# Patient Record
Sex: MALE | Race: WHITE | NOT HISPANIC OR LATINO
[De-identification: names, ages, dates, MRNs, and addresses within clinical notes are randomized per-mention and may not be internally consistent; named-entity substitution may affect disease eponyms.]

---

## 2019-02-28 ENCOUNTER — HOSPITAL (OUTPATIENT)
Dept: OTHER | Age: 79
End: 2019-02-28
Attending: INTERNAL MEDICINE

## 2019-03-01 ENCOUNTER — HOSPITAL (OUTPATIENT)
Dept: OTHER | Age: 79
End: 2019-03-01
Attending: INTERNAL MEDICINE

## 2019-04-01 ENCOUNTER — HOSPITAL (OUTPATIENT)
Dept: OTHER | Age: 79
End: 2019-04-01
Attending: INTERNAL MEDICINE

## 2019-05-01 ENCOUNTER — HOSPITAL (OUTPATIENT)
Dept: OTHER | Age: 79
End: 2019-05-01
Attending: INTERNAL MEDICINE

## 2019-06-17 ENCOUNTER — HOSPITAL ENCOUNTER (INPATIENT)
Facility: HOSPITAL | Age: 79
LOS: 3 days | Discharge: HOME HEALTH | DRG: 189 | End: 2019-06-20
Attending: EMERGENCY MEDICINE | Admitting: HOSPITALIST
Payer: MEDICARE

## 2019-06-17 ENCOUNTER — APPOINTMENT (OUTPATIENT)
Dept: NUCLEAR MEDICINE | Facility: HOSPITAL | Age: 79
DRG: 189 | End: 2019-06-17
Attending: HOSPITALIST
Payer: MEDICARE

## 2019-06-17 ENCOUNTER — APPOINTMENT (OUTPATIENT)
Dept: CT IMAGING | Facility: HOSPITAL | Age: 79
DRG: 189 | End: 2019-06-17
Attending: HOSPITALIST
Payer: MEDICARE

## 2019-06-17 ENCOUNTER — APPOINTMENT (OUTPATIENT)
Dept: GENERAL RADIOLOGY | Facility: HOSPITAL | Age: 79
DRG: 189 | End: 2019-06-17
Attending: EMERGENCY MEDICINE
Payer: MEDICARE

## 2019-06-17 DIAGNOSIS — J44.1 COPD EXACERBATION (HCC): ICD-10-CM

## 2019-06-17 DIAGNOSIS — J96.01 ACUTE HYPOXEMIC RESPIRATORY FAILURE (HCC): Primary | ICD-10-CM

## 2019-06-17 PROCEDURE — 71250 CT THORAX DX C-: CPT | Performed by: HOSPITALIST

## 2019-06-17 PROCEDURE — 99223 1ST HOSP IP/OBS HIGH 75: CPT | Performed by: HOSPITALIST

## 2019-06-17 PROCEDURE — 99223 1ST HOSP IP/OBS HIGH 75: CPT | Performed by: INTERNAL MEDICINE

## 2019-06-17 PROCEDURE — 78582 LUNG VENTILAT&PERFUS IMAGING: CPT | Performed by: HOSPITALIST

## 2019-06-17 PROCEDURE — 71045 X-RAY EXAM CHEST 1 VIEW: CPT | Performed by: EMERGENCY MEDICINE

## 2019-06-17 RX ORDER — POLYETHYLENE GLYCOL 3350 17 G/17G
17 POWDER, FOR SOLUTION ORAL DAILY PRN
Status: DISCONTINUED | OUTPATIENT
Start: 2019-06-17 | End: 2019-06-20

## 2019-06-17 RX ORDER — PRAVASTATIN SODIUM 20 MG
20 TABLET ORAL NIGHTLY
Status: DISCONTINUED | OUTPATIENT
Start: 2019-06-17 | End: 2019-06-20

## 2019-06-17 RX ORDER — METHYLPREDNISOLONE SODIUM SUCCINATE 125 MG/2ML
60 INJECTION, POWDER, LYOPHILIZED, FOR SOLUTION INTRAMUSCULAR; INTRAVENOUS EVERY 8 HOURS
Status: DISCONTINUED | OUTPATIENT
Start: 2019-06-17 | End: 2019-06-18

## 2019-06-17 RX ORDER — METOPROLOL TARTRATE 50 MG/1
50 TABLET, FILM COATED ORAL 2 TIMES DAILY
Status: DISCONTINUED | OUTPATIENT
Start: 2019-06-17 | End: 2019-06-20

## 2019-06-17 RX ORDER — MORPHINE SULFATE 2 MG/ML
1 INJECTION, SOLUTION INTRAMUSCULAR; INTRAVENOUS EVERY 2 HOUR PRN
Status: DISCONTINUED | OUTPATIENT
Start: 2019-06-17 | End: 2019-06-20

## 2019-06-17 RX ORDER — SODIUM CHLORIDE 0.9 % (FLUSH) 0.9 %
3 SYRINGE (ML) INJECTION AS NEEDED
Status: DISCONTINUED | OUTPATIENT
Start: 2019-06-17 | End: 2019-06-20

## 2019-06-17 RX ORDER — FLUTICASONE PROPIONATE 50 MCG
2 SPRAY, SUSPENSION (ML) NASAL DAILY
COMMUNITY

## 2019-06-17 RX ORDER — OMEPRAZOLE 40 MG/1
20 CAPSULE, DELAYED RELEASE ORAL
COMMUNITY

## 2019-06-17 RX ORDER — MORPHINE SULFATE 4 MG/ML
4 INJECTION, SOLUTION INTRAMUSCULAR; INTRAVENOUS EVERY 2 HOUR PRN
Status: DISCONTINUED | OUTPATIENT
Start: 2019-06-17 | End: 2019-06-20

## 2019-06-17 RX ORDER — CLOPIDOGREL BISULFATE 75 MG/1
75 TABLET ORAL DAILY
Status: DISCONTINUED | OUTPATIENT
Start: 2019-06-17 | End: 2019-06-20

## 2019-06-17 RX ORDER — HYDROCODONE BITARTRATE AND ACETAMINOPHEN 5; 325 MG/1; MG/1
2 TABLET ORAL EVERY 4 HOURS PRN
Status: DISCONTINUED | OUTPATIENT
Start: 2019-06-17 | End: 2019-06-20

## 2019-06-17 RX ORDER — DEXTROSE MONOHYDRATE 25 G/50ML
50 INJECTION, SOLUTION INTRAVENOUS AS NEEDED
Status: DISCONTINUED | OUTPATIENT
Start: 2019-06-17 | End: 2019-06-20

## 2019-06-17 RX ORDER — SIMVASTATIN 20 MG
20 TABLET ORAL NIGHTLY
COMMUNITY

## 2019-06-17 RX ORDER — LOSARTAN POTASSIUM 50 MG/1
50 TABLET ORAL DAILY
Status: DISCONTINUED | OUTPATIENT
Start: 2019-06-17 | End: 2019-06-18

## 2019-06-17 RX ORDER — ACETAMINOPHEN 325 MG/1
650 TABLET ORAL EVERY 6 HOURS PRN
Status: DISCONTINUED | OUTPATIENT
Start: 2019-06-17 | End: 2019-06-20

## 2019-06-17 RX ORDER — ONDANSETRON 2 MG/ML
4 INJECTION INTRAMUSCULAR; INTRAVENOUS EVERY 6 HOURS PRN
Status: DISCONTINUED | OUTPATIENT
Start: 2019-06-17 | End: 2019-06-20

## 2019-06-17 RX ORDER — FENOFIBRATE 134 MG/1
134 CAPSULE ORAL
Status: DISCONTINUED | OUTPATIENT
Start: 2019-06-17 | End: 2019-06-20

## 2019-06-17 RX ORDER — IPRATROPIUM BROMIDE AND ALBUTEROL SULFATE 2.5; .5 MG/3ML; MG/3ML
3 SOLUTION RESPIRATORY (INHALATION) EVERY 4 HOURS PRN
Status: DISCONTINUED | OUTPATIENT
Start: 2019-06-17 | End: 2019-06-20

## 2019-06-17 RX ORDER — CETIRIZINE HYDROCHLORIDE 5 MG/1
5 TABLET ORAL DAILY
Status: DISCONTINUED | OUTPATIENT
Start: 2019-06-17 | End: 2019-06-20

## 2019-06-17 RX ORDER — CLOPIDOGREL BISULFATE 75 MG/1
75 TABLET ORAL DAILY
COMMUNITY

## 2019-06-17 RX ORDER — PANTOPRAZOLE SODIUM 40 MG/1
40 TABLET, DELAYED RELEASE ORAL
Status: DISCONTINUED | OUTPATIENT
Start: 2019-06-17 | End: 2019-06-20

## 2019-06-17 RX ORDER — FLUTICASONE PROPIONATE 50 MCG
2 SPRAY, SUSPENSION (ML) NASAL DAILY
Status: DISCONTINUED | OUTPATIENT
Start: 2019-06-17 | End: 2019-06-20

## 2019-06-17 RX ORDER — METOPROLOL TARTRATE 50 MG/1
50 TABLET, FILM COATED ORAL 2 TIMES DAILY
Status: ON HOLD | COMMUNITY
End: 2019-06-20

## 2019-06-17 RX ORDER — IPRATROPIUM BROMIDE AND ALBUTEROL SULFATE 2.5; .5 MG/3ML; MG/3ML
3 SOLUTION RESPIRATORY (INHALATION) ONCE
Status: COMPLETED | OUTPATIENT
Start: 2019-06-17 | End: 2019-06-17

## 2019-06-17 RX ORDER — HEPARIN SODIUM 5000 [USP'U]/ML
5000 INJECTION, SOLUTION INTRAVENOUS; SUBCUTANEOUS EVERY 8 HOURS SCHEDULED
Status: DISCONTINUED | OUTPATIENT
Start: 2019-06-17 | End: 2019-06-20

## 2019-06-17 RX ORDER — HYDRALAZINE HYDROCHLORIDE 20 MG/ML
10 INJECTION INTRAMUSCULAR; INTRAVENOUS EVERY 4 HOURS PRN
Status: DISCONTINUED | OUTPATIENT
Start: 2019-06-17 | End: 2019-06-20

## 2019-06-17 RX ORDER — DOCUSATE SODIUM 100 MG/1
100 CAPSULE, LIQUID FILLED ORAL 2 TIMES DAILY
Status: DISCONTINUED | OUTPATIENT
Start: 2019-06-17 | End: 2019-06-20

## 2019-06-17 RX ORDER — MONTELUKAST SODIUM 10 MG/1
10 TABLET ORAL NIGHTLY
COMMUNITY

## 2019-06-17 RX ORDER — MONTELUKAST SODIUM 10 MG/1
10 TABLET ORAL NIGHTLY
Status: DISCONTINUED | OUTPATIENT
Start: 2019-06-17 | End: 2019-06-20

## 2019-06-17 RX ORDER — HYDROCODONE BITARTRATE AND ACETAMINOPHEN 5; 325 MG/1; MG/1
1 TABLET ORAL EVERY 4 HOURS PRN
Status: DISCONTINUED | OUTPATIENT
Start: 2019-06-17 | End: 2019-06-20

## 2019-06-17 RX ORDER — FEXOFENADINE HCL 180 MG/1
180 TABLET ORAL DAILY
COMMUNITY

## 2019-06-17 RX ORDER — SODIUM CHLORIDE 9 MG/ML
INJECTION, SOLUTION INTRAVENOUS ONCE
Status: COMPLETED | OUTPATIENT
Start: 2019-06-17 | End: 2019-06-17

## 2019-06-17 RX ORDER — METOCLOPRAMIDE HYDROCHLORIDE 5 MG/ML
5 INJECTION INTRAMUSCULAR; INTRAVENOUS EVERY 8 HOURS PRN
Status: DISCONTINUED | OUTPATIENT
Start: 2019-06-17 | End: 2019-06-20

## 2019-06-17 RX ORDER — BISACODYL 10 MG
10 SUPPOSITORY, RECTAL RECTAL
Status: DISCONTINUED | OUTPATIENT
Start: 2019-06-17 | End: 2019-06-20

## 2019-06-17 RX ORDER — MORPHINE SULFATE 2 MG/ML
2 INJECTION, SOLUTION INTRAMUSCULAR; INTRAVENOUS EVERY 2 HOUR PRN
Status: DISCONTINUED | OUTPATIENT
Start: 2019-06-17 | End: 2019-06-20

## 2019-06-17 RX ORDER — HEPARIN SODIUM 5000 [USP'U]/ML
5000 INJECTION, SOLUTION INTRAVENOUS; SUBCUTANEOUS EVERY 12 HOURS SCHEDULED
Status: DISCONTINUED | OUTPATIENT
Start: 2019-06-17 | End: 2019-06-17

## 2019-06-17 RX ORDER — RUFINAMIDE 40 MG/ML
1 SUSPENSION ORAL DAILY
Status: DISCONTINUED | OUTPATIENT
Start: 2019-06-18 | End: 2019-06-20

## 2019-06-17 RX ORDER — NITROGLYCERIN 0.4 MG/1
0.4 TABLET SUBLINGUAL EVERY 5 MIN PRN
Status: DISCONTINUED | OUTPATIENT
Start: 2019-06-17 | End: 2019-06-20

## 2019-06-17 RX ORDER — FENOFIBRATE 145 MG/1
145 TABLET, COATED ORAL DAILY
COMMUNITY

## 2019-06-17 RX ORDER — PANTOPRAZOLE SODIUM 20 MG/1
20 TABLET, DELAYED RELEASE ORAL
Status: DISCONTINUED | OUTPATIENT
Start: 2019-06-17 | End: 2019-06-17

## 2019-06-17 RX ORDER — ACETAMINOPHEN 325 MG/1
650 TABLET ORAL EVERY 4 HOURS PRN
Status: DISCONTINUED | OUTPATIENT
Start: 2019-06-17 | End: 2019-06-20

## 2019-06-17 RX ORDER — LOSARTAN POTASSIUM 50 MG/1
50 TABLET ORAL DAILY
Status: ON HOLD | COMMUNITY
End: 2019-06-20

## 2019-06-17 RX ORDER — BUDESONIDE AND FORMOTEROL FUMARATE DIHYDRATE 160; 4.5 UG/1; UG/1
AEROSOL RESPIRATORY (INHALATION) 2 TIMES DAILY
COMMUNITY
End: 2019-07-01 | Stop reason: ALTCHOICE

## 2019-06-17 RX ORDER — METHYLPREDNISOLONE SODIUM SUCCINATE 125 MG/2ML
125 INJECTION, POWDER, LYOPHILIZED, FOR SOLUTION INTRAMUSCULAR; INTRAVENOUS ONCE
Status: COMPLETED | OUTPATIENT
Start: 2019-06-17 | End: 2019-06-17

## 2019-06-17 RX ORDER — IPRATROPIUM BROMIDE AND ALBUTEROL SULFATE 2.5; .5 MG/3ML; MG/3ML
3 SOLUTION RESPIRATORY (INHALATION)
Status: DISCONTINUED | OUTPATIENT
Start: 2019-06-17 | End: 2019-06-20

## 2019-06-17 RX ORDER — ZOLPIDEM TARTRATE 5 MG/1
2.5 TABLET ORAL NIGHTLY PRN
Status: DISCONTINUED | OUTPATIENT
Start: 2019-06-17 | End: 2019-06-20

## 2019-06-17 RX ORDER — IPRATROPIUM BROMIDE AND ALBUTEROL SULFATE 2.5; .5 MG/3ML; MG/3ML
3 SOLUTION RESPIRATORY (INHALATION)
Status: DISCONTINUED | OUTPATIENT
Start: 2019-06-17 | End: 2019-06-17

## 2019-06-17 NOTE — DIETARY NOTE
ADULT NUTRITION INITIAL ASSESSMENT    Pt is at moderate nutrition risk. Defer Dx of malnutrition until physical assessment completed.      CRITERIA FOR MALNUTRITION DIAGNOSIS:  N/A .    RECOMMENDATIONS TO MD:  See Nutrition Intervention     NUTRITION Karolyn [facility] Glucerna and sugar-free shakes while on steroids. Rational/use of oral supplements discussed with family. Flavor preferences obtained. .    - Vitamin and mineral supplements: multivitamin/mineral initiated.      - Feeding assistance: meal set up    - Nutrit • Insulin Aspart Pen  1-5 Units Subcutaneous TID CC       LABS: reviewed  Recent Labs     06/17/19  0438   *   BUN 35*   CREATSERUM 1.95*   CA 9.3      K 4.4      CO2 32.0   OSMOCALC 306*       NUTRITION RELATED PHYSICAL FINDINGS: defe

## 2019-06-17 NOTE — ED PROVIDER NOTES
Patient Seen in: [facility] AND [facility] Emergency Department    History   Patient presents with:  Dyspnea LINDA SOB (respiratory)    Stated Complaint:     HPI    25-year-old male with history of lung cancer, here with complaints of difficulty breathing for the normal for rhythm, no acute ST changes      Cardiac Monitor:    Patient placed on the cardiac monitor and a rhythm strip obtained with the indication of difficulty breathing.   Monitor shows regular rhythm at a rate of 87 bpm.     My interpretation is   nor - 5.80 x10(6)uL    HGB 11.6 (L) 13.0 - 17.5 g/dL    HCT 37.9 (L) 39.0 - 53.0 %    MCV 96.9 80.0 - 100.0 fL    MCH 29.7 26.0 - 34.0 pg    MCHC 30.6 (L) 31.0 - 37.0 g/dL    RDW-SD 48.0 (H) 35.1 - 46.3 fL    RDW 13.5 11.0 - 15.0 %    .0 150.0 - 450.0 1 report has been electronically signed and verified by the Radiologist whose name is printed above. DD:  06/17/2019/DT:  06/17/2019      EMERGENCY DEPARTMENT COURSE AND TREATMENT:  Patient's condition was critical during Emergency Department evaluation. [facility])  (primary encounter diagnosis)  COPD exacerbation ([facility]    Disposition:  Admit  6/17/2019  6:00 am    Follow-up:  No follow-up provider specified.   We recommend that you schedule follow up care with a primary care provider within the next three month

## 2019-06-17 NOTE — ED INITIAL ASSESSMENT (HPI)
Pt presents to ED via [facility] for shortness of breath since yesterday. Pt states he usually wears 10-15L of oxygen at home. Pt has a hx of lung cancer. Per medics pt had expiratory wheezing they gave 2 rounds of albuterol en route.

## 2019-06-17 NOTE — PROGRESS NOTES
[facility] Pharmacy Note:  Renal Dose Adjustment for Metoclopramide (REGLAN)    Anali Asencio has been prescribed Metoclopramide (REGLAN) 10 mg every 8 hours as needed for n/v.    Estimated Creatinine Clearance: 22.3 mL/min (A) (based on SCr of 1.95 mg/dL (H)).

## 2019-06-17 NOTE — H&P
[facility]    PATIENT'S NAME: Ashley Simms   ATTENDING PHYSICIAN: Verónica Clinton MD   PATIENT ACCOUNT#:   [id number]    LOCATION:  [address] [place] [address] #:   [id number]       YOB: 1940  ADMISSION DATE:       06/17/2 without getting short of breath, and he has no difficulty eating, choking, or swallowing. No unusual chest pains. No previous history of blood clots. No difficulties with his pain in the abdomen, difficulty urination.   No difficulty with bowel movements hypoxic and mildly hypercarbic respiratory failure on chronic hypoxic respiratory failure, probably hypercarbic as well related to COPD exacerbation, bronchitis. We will check CT scan of the chest, rule out pulmonary embolism. We will see how he does.   Evander Harada

## 2019-06-17 NOTE — CONSULTS
[facility]    Report of Consultation    Stephen Oshea Patient Status:  Inpatient    1940 MRN [id number]   Location [facility] 2W/SW Attending Art Clinton Day # 0 PCP PHYSICIAN NONSTAFF     Date of Admiss day   ipratropium-albuterol (DUONEB) nebulizer solution 3 mL 3 mL Nebulization 6 times per day   methylPREDNISolone Sodium Succ (Solu-MEDROL) injection 60 mg 60 mg Intravenous Q8H   ondansetron HCl (ZOFRAN) injection 4 mg 4 mg Intravenous Q6H PRN   azithro Date    WBC 10.7 06/17/2019    HGB 11.6 (L) 06/17/2019    HCT 37.9 (L) 06/17/2019    .0 06/17/2019    CREATSERUM 1.95 (H) 06/17/2019    BUN 35 (H) 06/17/2019     06/17/2019    K 4.4 06/17/2019     06/17/2019    CO2 32.0 06/17/2019    GLU ago    4– DVT prophylaxis with heparin subcu    5–full code per patient's wish          Thank you for allowing me to participate in the care of your patient. Lillie Leal  6/17/2019

## 2019-06-17 NOTE — PLAN OF CARE
Pulmonary (Raslan): titrate O2 to 92% while awake, 89% while asleep   Hospitalist [facility]): admission, home medications, chest CT, VQ scan     Patient remains alert, oriented, and able to participate in plan of care throughout shift.    Oxygen titrated a

## 2019-06-17 NOTE — PROGRESS NOTES
[facility] Pharmacy Note:  Renal Dose Adjustment for Cetirizine (ZYRTEC)    Stephani Celestin has been prescribed Cetirizine (Zyrtec) 10 mg orally daily. Estimated Creatinine Clearance: 22.3 mL/min (A) (based on SCr of 1.95 mg/dL (H)).     His calculated creatinin

## 2019-06-17 NOTE — ED NOTES
PT c/o sob since yesterday, respirations labored, pt states no relief with home o2 15L nc. Pt awake, alert, diminished lung sounds. MD to pt bedside, called to request bipap from respiratory.

## 2019-06-17 NOTE — CM/SW NOTE
6/17: SW met with patient and children, Oleg and Villa tan. Family reports that patient lives in a bi-level home with son Dirk maddox. Daughter, Oleg resides within 5 minutes from family. There are 8 steps to the second floor.  Patient is independent with ADLs/

## 2019-06-18 PROCEDURE — 99233 SBSQ HOSP IP/OBS HIGH 50: CPT | Performed by: NURSE PRACTITIONER

## 2019-06-18 PROCEDURE — 99233 SBSQ HOSP IP/OBS HIGH 50: CPT | Performed by: INTERNAL MEDICINE

## 2019-06-18 RX ORDER — METHYLPREDNISOLONE SODIUM SUCCINATE 40 MG/ML
40 INJECTION, POWDER, LYOPHILIZED, FOR SOLUTION INTRAMUSCULAR; INTRAVENOUS EVERY 12 HOURS
Status: DISCONTINUED | OUTPATIENT
Start: 2019-06-18 | End: 2019-06-19

## 2019-06-18 RX ORDER — AZITHROMYCIN 250 MG/1
500 TABLET, FILM COATED ORAL
Status: DISCONTINUED | OUTPATIENT
Start: 2019-06-19 | End: 2019-06-19

## 2019-06-18 NOTE — PLAN OF CARE
Problem: Patient Centered Care  Goal: Patient preferences are identified and integrated in the patient's plan of care  Description  Interventions:  - What would you like us to know as we care for you?    - Provide timely, complete, and accurate informati

## 2019-06-18 NOTE — PROGRESS NOTES
[place] [facility]    Progress Note    Maxine Carter Patient Status:  Inpatient    1940 MRN [id number]   Location [facility] 2W/SW Attending Art Clinton Day # 1 PCP PHYSICIAN NONSTAFF        Subjective:     Co malnutrition  - dietician consult    Reflux. Continue medications. DVT prophylaxis: subcutaneous heparin   Code status is Full Code. Discussed with patient during this hospitalization.    Dispo: pending, tx to floor      >35 mins spent, more than 50% o imaging. Bronchial wall thickening suggestive of mild chronic airway inflammation.       Dictated by (CST): Radha Valiente MD on 6/17/2019 at 13:11     Approved by (CST): Radha Valiente MD on 6/17/2019 at 13:18          Xr Chest Ap Portable  (cpt=71045)    Res changes have occurred Electronically signed on 06/17/2019 at 13:26 by Darryl Garner MD    Ekg 12-lead    Result Date: 6/17/2019  ECG Report  Interpretation  -------------------------- Atrial Rhythm Low voltage in limb leads.  possible inferior injury, artif

## 2019-06-18 NOTE — SLP NOTE
ADULT SWALLOWING EVALUATION    ASSESSMENT    ASSESSMENT/OVERALL IMPRESSION:  Orders rec'd, chart reviewed. SLP collaborated with RN prior to seeing patient.  RN reported no notable dysphagia during this AM. However, per patient report, no breakfast meal int failure [facility])  Active Problems:    COPD exacerbation [facility])      Past Medical History  Past Medical History:   Diagnosis Date   • Cancer ([facility]     lung        Prior Living Situation: (From home)  Diet Prior to Admission: Regular; Thin liquids  Precautions: A Intact  Bolus Formation: Intact  Bolus Propulsion: Intact  Mastication: Impaired  Retention: Intact    Pharyngeal Phase of Swallow: Within Functional Limits  (Please note: Silent aspiration cannot be evaluated clinically.  Videofluoroscopic Swallow Study is

## 2019-06-18 NOTE — PROGRESS NOTES
[place] [facility] - [facility]    Progress Note    Risabj Hathaway Patient Status:  Inpatient    1940 MRN [id number]   Location [facility] 2W/SW Attending Art Clinton Day # 1 PCP PHYSICIAN NONSTAFF        Subjective:     Co Wean down steroid   z-pack                    Results:     Lab Results   Component Value Date    WBC 10.0 06/18/2019    HGB 9.4 (L) 06/18/2019    HCT 30.2 (L) 06/18/2019    .0 06/18/2019    CREATSERUM 1.90 (H) 06/18/2019    BUN 43 (H) 06/18/2019 left more than right with blunting of both costophrenic angles. 2. Questionable ovoid density in the left perihilar region measuring 2.3 x 1.6 cm. Underlying mass is not excluded. Recommend comparison with old films or CT scanning.  3. Asymmetric right ap Shera Simmonds MD Ambrose Kirschner Eluterio Arm, MD  6/18/2019

## 2019-06-18 NOTE — SLP NOTE
SPEECH DAILY NOTE - INPATIENT    ASSESSMENT & PLAN   ASSESSMENT  Pt seen sitting upright in chair for all PO trials and monitoring. Pt with functional oral transit time and mastication with upper and lower dentures in place for session.  Rec upgrade to gene liquids without overt signs or symptoms of aspiration with 100 % accuracy over 1 session(s). Pt seen sitting upright in chair with upper and lower dentures in place for trial upgrade to hard solid mastication.  Pt with functional oral transit time and no

## 2019-06-18 NOTE — CM/SW NOTE
Care Coordination/BPCI-A:    Met with patient at bedside to explain the BPCI/Medicare program. Patient agreed with phone f/u for 3 months from [address] [place] [address] after discharge from [facility]. Patient was enrolled under DRG  190.  BPCI/Medicare Letter and Broch

## 2019-06-18 NOTE — OCCUPATIONAL THERAPY NOTE
OCCUPATIONAL THERAPY EVALUATION - INPATIENT     Room Number: 064/932-Z  Evaluation Date: 6/18/2019  Type of Evaluation: Initial       Physician Order: IP Consult to Occupational Therapy  Reason for Therapy: ADL/IADL Dysfunction and Discharge Planning    OC therapist. Patient left up in chair with all needs in reach. Continue skilled therapy in prep for d/c to home. RN notified that patient did experience drop in BP with activities- supine 109/57 to 90/54.       DISCHARGE RECOMMENDATIONS: Home with [facility] is within functional limits     STRENGTH ASSESSMENT  Upper extremity strength is within functional limits     ACTIVITIES OF DAILY LIVING ASSESSMENT  AM-PAC ‘6-Clicks’ Inpatient Daily Activity Short Form  How much help from another person does the patient c

## 2019-06-18 NOTE — PHYSICAL THERAPY NOTE
PHYSICAL THERAPY EVALUATION - INPATIENT     Room Number: 724/567-G  Evaluation Date: 6/18/2019  Type of Evaluation: Initial   Physician Order: PT Eval and Treat    Presenting Problem: Acute hypoxemic  Reason for Therapy: Mobility Dysfunction and Discharge fluidity of gait. Patient will benefit from continued IP PT services to address these deficits in preparation for discharge. DISCHARGE RECOMMENDATIONS  PT Discharge Recommendations: Home with home health PT; Intermittent Supervision    PLAN  PT Treatm 81  Heart Rate Source: Monitor     BP: 90/54  BP Location: Right arm  BP Method: Automatic  Patient Position: Sitting    O2 WALK        SPO2 Ambulation on Oxygen: 85(85% with activity, improved to 92% at rest)  Ambulation oxygen flow (liters per minute): 1 - rolling     Goal #2  Current Status    Goal #3 Patient is able to ambulate 150 feet with assist device: walker - rolling at assistance level: supervision while maintaining SpO2>90% with activity   Goal #3   Current Status    Goal #4 Patient will negotiat

## 2019-06-18 NOTE — HOME CARE LIAISON
Received referral from [facility], . Met with patient at the bedside. Then contacted his daughter Curtis James ([phone]), per his request. [facility] is agree[facility] to [facility], pending orders.  Residential brochure pro

## 2019-06-19 PROCEDURE — 99233 SBSQ HOSP IP/OBS HIGH 50: CPT | Performed by: HOSPITALIST

## 2019-06-19 PROCEDURE — 99232 SBSQ HOSP IP/OBS MODERATE 35: CPT | Performed by: INTERNAL MEDICINE

## 2019-06-19 RX ORDER — PREDNISONE 20 MG/1
40 TABLET ORAL
Status: DISCONTINUED | OUTPATIENT
Start: 2019-06-19 | End: 2019-06-20

## 2019-06-19 RX ORDER — AZITHROMYCIN 250 MG/1
500 TABLET, FILM COATED ORAL
Status: DISCONTINUED | OUTPATIENT
Start: 2019-06-19 | End: 2019-06-20

## 2019-06-19 RX ORDER — ECHINACEA PURPUREA EXTRACT 125 MG
1 TABLET ORAL
Status: DISCONTINUED | OUTPATIENT
Start: 2019-06-19 | End: 2019-06-20

## 2019-06-19 NOTE — PLAN OF CARE
Problem: Patient Centered Care  Goal: Patient preferences are identified and integrated in the patient's plan of care  Description  Interventions:  - What would you like us to know as we care for you?  I use oxygen at home  - Provide timely, complete, and respiratory difficulty  - Respiratory Therapy support as indicated  - Manage/alleviate anxiety  - Monitor for signs/symptoms of CO2 retention  6/19/2019 0549 by Duane Mackintosh, RN  Outcome: Progressing  6/18/2019 2325 by Duane Mackintosh, RN  Outcome: Pr

## 2019-06-19 NOTE — PROGRESS NOTES
Double RN skin check done prior to transfer off Unit. Skin check performed by this RN and Camilo Rodrigez RN. Wounds are as follows: blanchable redness on right butt cheek. Will remain available for any further questions or concerns.

## 2019-06-19 NOTE — PROGRESS NOTES
[facility]    Progress Note    Stephen Oshea Patient Status:  Inpatient    1940 MRN [id number]   Location [facility] 5SW/SE Attending Porsha Olvera, [address] Day # 2 PCP PHYSICIAN NONSTAFF        Subjective:     Luis CREATSERUM 1.88 (H) 06/19/2019    BUN 60 (H) 06/19/2019     06/19/2019    K 4.9 06/19/2019     06/19/2019    CO2 29.0 06/19/2019     (H) 06/19/2019    CA 9.0 06/19/2019    ALB 2.9 (L) 06/18/2019    ALKPHO 67 06/18/2019    BILT 0.4 06/18/ 6/17/2019  ECG Report  Interpretation  -------------------------- Sinus Rhythm Low voltage in limb leads.  -Incomplete left bundle branch block. -Inferior infarct -probably recent.  -Anterolateral ST-elevation -nondiagnostic -consider injury.  ABNORMAL Whe

## 2019-06-19 NOTE — PROGRESS NOTES
[place] [facility] - [facility]    Progress Note    Loetta Pallas Patient Status:  Inpatient    1940 MRN [id number]   Location [facility] 2W/SW Attending Art Clinton Day # 2 PCP PHYSICIAN NONSTAFF        Subjective:     Co disease. Continue medications. Protein calorie malnutrition  - dietician consult    Reflux. Continue medications. DVT prophylaxis: subcutaneous heparin   Code status is Full Code. Discussed with patient during this hospitalization.    Dispo: anastasia 6/17/2019 at 13:11     Approved by (CST): Flor Leung MD on 6/17/2019 at 13:18          Nm Lung Vq Vent / Perfusion Scan  (NZJ=[id number])    Result Date: 6/17/2019  CONCLUSION: Ventilation/perfusion lung scan findings are low probability for pulmonary embolis

## 2019-06-19 NOTE — PLAN OF CARE
Problem: Patient Centered Care  Goal: Patient preferences are identified and integrated in the patient's plan of care  Description  Interventions:  - What would you like us to know as we care for you?  I use oxygen at home  - Provide timely, complete, and ordered  - Assess for signs and symptoms of hyperglycemia and hypoglycemia  - Administer ordered medications to maintain glucose within target range  - Assess barriers to adequate nutritional intake and initiate nutrition consult as needed  - Instruct tamar

## 2019-06-19 NOTE — PROGRESS NOTES
[facility] Pharmacy Note:  Adjustment for azithromycin (Virgle Everts)    Dorothy Poag is a 78year old male who has been prescribed azithromycin (ZITHROMAX) 250 mg every 24 hrs.   CrCl is estimated creatinine clearance is 23.9 mL/min (A) (based on SCr of 1.88 mg/dL

## 2019-06-19 NOTE — OCCUPATIONAL THERAPY NOTE
OCCUPATIONAL THERAPY TREATMENT NOTE - INPATIENT        Room Number: 530/530-A                Problem List  Principal Problem:    Acute hypoxemic respiratory failure (HCC)  Active Problems:    [facility]      OCCUPATIONAL THERAPY ASSESSMENT anticipate pt would benefit from [facility] services at discharge.     DISCHARGE RECOMMENDATIONS  OT Discharge Recommendations: Home with home health PT/OT  OT Device Recommendations: Shower chair;Grab bars     PLAN  OT Treatment Plan: Energy conservation/work si within reach    OT Goals:        Patient will complete functional transfer with Mod I with LRAD   Comment: progressing    Patient will complete toileting with Mod I   Comment: pt declined    Patient will tolerate standing for 5-8 minutes in prep for adls w

## 2019-06-19 NOTE — PHYSICAL THERAPY NOTE
PHYSICAL THERAPY TREATMENT NOTE - INPATIENT     Room Number: 128/043-D       Presenting Problem: Acute hypoxemic    Problem List  Principal Problem:    Acute hypoxemic respiratory failure (HCC)  Active Problems:    COPD exacerbation (HCC)      PHYSICAL THE PT;Intermittent Supervision     PLAN  PT Treatment Plan: Bed mobility; Body mechanics; Endurance; Energy conservation;Patient education;Gait training;Transfer training;Balance training    SUBJECTIVE  \"I did this in pulmonary rehab. \"    OBJECTIVE  Precaution balance)  Stoop/Curb Assistance: Not tested  Comment : x 3 bouts of ambulation, saturation monitored closely    Patient End of Session: Up in chair;Needs met;Call light within reach;RN aware of session/findings; All patient questions and concerns addressed;

## 2019-06-20 VITALS
DIASTOLIC BLOOD PRESSURE: 51 MMHG | BODY MASS INDEX: 19.72 KG/M2 | OXYGEN SATURATION: 97 % | WEIGHT: 115.5 LBS | HEART RATE: 71 BPM | RESPIRATION RATE: 18 BRPM | SYSTOLIC BLOOD PRESSURE: 96 MMHG | HEIGHT: 64 IN | TEMPERATURE: 97 F

## 2019-06-20 PROCEDURE — 99239 HOSP IP/OBS DSCHRG MGMT >30: CPT | Performed by: HOSPITALIST

## 2019-06-20 PROCEDURE — 99232 SBSQ HOSP IP/OBS MODERATE 35: CPT | Performed by: INTERNAL MEDICINE

## 2019-06-20 RX ORDER — PREDNISONE 20 MG/1
TABLET ORAL
Qty: 15 TABLET | Refills: 0 | Status: SHIPPED | OUTPATIENT
Start: 2019-06-21 | End: 2019-07-03

## 2019-06-20 RX ORDER — METOPROLOL TARTRATE 50 MG/1
25 TABLET, FILM COATED ORAL 2 TIMES DAILY
Qty: 30 TABLET | Refills: 0 | Status: SHIPPED | OUTPATIENT
Start: 2019-06-20

## 2019-06-20 RX ORDER — AZITHROMYCIN 250 MG/1
250 TABLET, FILM COATED ORAL DAILY
Qty: 1 TABLET | Refills: 0 | Status: SHIPPED | OUTPATIENT
Start: 2019-06-20 | End: 2019-06-21

## 2019-06-20 NOTE — DISCHARGE SUMMARY
[facility]    Discharge Summary    Berlin Ocampo Patient Status:  Inpatient    1940 MRN [id number]   Location [facility] 5SW/SE Attending Tacos Parker, [address] [facility] Day # 3 PCP PHYSICIAN NONSTAFF     Date of Admission:  oxygen per minute but that is unusual for him depending on how he feels.     Hospital Course:       Acute hypoxic and mildly hypercarbic respiratory failure on chronic hypoxic respiratory failure, probably hypercarbic as well related to COPD exacerbation, b 6/21/2019      Take 2 tablets (40 mg total) by mouth daily with breakfast for 3 days, THEN 1.5 tablets (30 mg total) daily with breakfast for 3 days, THEN 1 tablet (20 mg total) daily with breakfast for 3 days, THEN 0.5 tablets (10 mg total) daily with naomi doctor or nurse    Bring a paper prescription for each of these medications  · azithromycin 250 MG Tabs  · Metoprolol Tartrate 50 MG Tabs  · predniSONE 20 MG Tabs         Follow up Visits:  Follow-up with PCP  in 1 week    Follow up Labs: none     Other Dis

## 2019-06-20 NOTE — OCCUPATIONAL THERAPY NOTE
OCCUPATIONAL THERAPY TREATMENT NOTE - INPATIENT        Room Number: 530/530-A                Problem List  Principal Problem:    Acute hypoxemic respiratory failure (HCC)  Active Problems:    [facility]      OCCUPATIONAL THERAPY ASSESSMENT another person does the patient currently need…  -   Putting on and taking off regular lower body clothing?: A Little  -   Bathing (including washing, rinsing, drying)?: A Little  -   Toileting, which includes using toilet, bedpan or urinal? : A Little  -

## 2019-06-20 NOTE — CM/SW NOTE
[facility] notified of patients discharge    Orders in [address] [facility], [address] [facility]  Phone # [phone]

## 2019-06-20 NOTE — RESPIRATORY THERAPY NOTE
Patient seen for COPD education. Education packet reviewed. Video shown. Encouraged the use of incentive spirometer and accapella. Encourage to make and keep appointment at COPD clinic.

## 2019-06-20 NOTE — PROGRESS NOTES
[facility] - [facility]    Progress Note      Assessment and Plan:   1. Acute COPD exacerbation–patient is much better clinically. Anxious to go home.     Recommendations: Okay to discharge home with azithromycin and prednisone and patient can follow-u

## 2019-06-20 NOTE — PLAN OF CARE
Problem: RESPIRATORY - ADULT  Goal: Achieves optimal ventilation and oxygenation  Description  INTERVENTIONS:  - Assess for changes in respiratory status  - Assess for changes in mentation and behavior  - Position to facilitate oxygenation and minimize r stability  Description  INTERVENTIONS:  - Monitor vital signs, rhythm, and trends  - Monitor for bleeding, hypotension and signs of decreased cardiac output  - Evaluate effectiveness of vasoactive medications to optimize hemodynamic stability  - Monitor ar

## 2019-06-21 ENCOUNTER — TELEPHONE (OUTPATIENT)
Dept: MEDSURG UNIT | Facility: HOSPITAL | Age: 79
End: 2019-06-21

## 2019-06-24 ENCOUNTER — TELEPHONE (OUTPATIENT)
Dept: MEDSURG UNIT | Facility: HOSPITAL | Age: 79
End: 2019-06-24

## 2019-07-01 ENCOUNTER — OFFICE VISIT (OUTPATIENT)
Dept: CARDIOLOGY CLINIC | Facility: HOSPITAL | Age: 79
End: 2019-07-01
Attending: CLINICAL NURSE SPECIALIST
Payer: MEDICARE

## 2019-07-01 VITALS
BODY MASS INDEX: 20 KG/M2 | DIASTOLIC BLOOD PRESSURE: 71 MMHG | OXYGEN SATURATION: 96 % | HEART RATE: 67 BPM | WEIGHT: 116 LBS | SYSTOLIC BLOOD PRESSURE: 154 MMHG

## 2019-07-01 DIAGNOSIS — J44.1 COPD EXACERBATION (HCC): Primary | ICD-10-CM

## 2019-07-01 DIAGNOSIS — J44.9 BRONCHITIS, CHRONIC OBSTRUCTIVE (HCC): ICD-10-CM

## 2019-07-01 PROBLEM — J44.89 BRONCHITIS, CHRONIC OBSTRUCTIVE: Status: ACTIVE | Noted: 2019-07-01

## 2019-07-01 PROBLEM — I10 HTN (HYPERTENSION): Status: ACTIVE | Noted: 2019-07-01

## 2019-07-01 PROCEDURE — 99214 OFFICE O/P EST MOD 30 MIN: CPT | Performed by: CLINICAL NURSE SPECIALIST

## 2019-07-01 PROCEDURE — 99212 OFFICE O/P EST SF 10 MIN: CPT | Performed by: CLINICAL NURSE SPECIALIST

## 2019-07-01 NOTE — PROGRESS NOTES
[id number] [facility] Patient Status:  Outpatient    1940 MRN [id number]   Location [facility] PHYSICIAN ИРИНА Michaels  is a 78year old male who presents to clinic for assessment of c 0.94 (H) 06/17/2019 07:19 AM    MG 2.3 06/18/2019 04:24 AM    TROP <0.045 06/17/2019 04:38 AM    PGLU 202 (H) 06/20/2019 12:23 PM       Clinical labs drawn by MA: NA    /72   Pulse 74   Wt 116 lb (52.6 kg)   SpO2 90%   BMI 19.91 kg/m²     D'c weight: monitor your blood pressure daily. If your blood pressure readings are >130, we may resume your Losartan      Please contact your pulmonologist if you experience increased shortness of breath or wheezing after stopping steroids.      Return to [facility]

## 2019-07-01 NOTE — PATIENT INSTRUCTIONS
Please start Trelegy inhaler once daily. Check with your insurance company and/or pharmacy to inquire about the cost of Trelegy moving forward. Please monitor your blood pressure daily.  If your blood pressure readings are >130, we may resume your L

## 2019-08-11 ENCOUNTER — HOSPITAL ENCOUNTER (INPATIENT)
Facility: HOSPITAL | Age: 79
LOS: 2 days | Discharge: HOME HEALTH | DRG: 189 | End: 2019-08-13
Attending: EMERGENCY MEDICINE | Admitting: HOSPITALIST
Payer: MEDICARE

## 2019-08-11 ENCOUNTER — APPOINTMENT (OUTPATIENT)
Dept: GENERAL RADIOLOGY | Facility: HOSPITAL | Age: 79
DRG: 189 | End: 2019-08-11
Payer: MEDICARE

## 2019-08-11 DIAGNOSIS — J44.1 COPD EXACERBATION (HCC): Primary | ICD-10-CM

## 2019-08-11 LAB
ANION GAP SERPL CALC-SCNC: 6 MMOL/L (ref 0–18)
BASOPHILS # BLD AUTO: 0.02 X10(3) UL (ref 0–0.2)
BASOPHILS NFR BLD AUTO: 0.3 %
BUN BLD-MCNC: 41 MG/DL (ref 7–18)
BUN/CREAT SERPL: 23.7 (ref 10–20)
CALCIUM BLD-MCNC: 9.5 MG/DL (ref 8.5–10.1)
CHLORIDE SERPL-SCNC: 109 MMOL/L (ref 98–112)
CO2 SERPL-SCNC: 30 MMOL/L (ref 21–32)
CREAT BLD-MCNC: 1.73 MG/DL (ref 0.7–1.3)
DEPRECATED RDW RBC AUTO: 51.2 FL (ref 35.1–46.3)
EOSINOPHIL # BLD AUTO: 0.3 X10(3) UL (ref 0–0.7)
EOSINOPHIL NFR BLD AUTO: 4.1 %
ERYTHROCYTE [DISTWIDTH] IN BLOOD BY AUTOMATED COUNT: 14.2 % (ref 11–15)
GLUCOSE BLD-MCNC: 117 MG/DL (ref 70–99)
HCT VFR BLD AUTO: 36.3 % (ref 39–53)
HGB BLD-MCNC: 11 G/DL (ref 13–17.5)
IMM GRANULOCYTES # BLD AUTO: 0.02 X10(3) UL (ref 0–1)
IMM GRANULOCYTES NFR BLD: 0.3 %
LYMPHOCYTES # BLD AUTO: 1.06 X10(3) UL (ref 1–4)
LYMPHOCYTES NFR BLD AUTO: 14.4 %
MCH RBC QN AUTO: 29.9 PG (ref 26–34)
MCHC RBC AUTO-ENTMCNC: 30.3 G/DL (ref 31–37)
MCV RBC AUTO: 98.6 FL (ref 80–100)
MONOCYTES # BLD AUTO: 0.51 X10(3) UL (ref 0.1–1)
MONOCYTES NFR BLD AUTO: 6.9 %
NEUTROPHILS # BLD AUTO: 5.47 X10 (3) UL (ref 1.5–7.7)
NEUTROPHILS # BLD AUTO: 5.47 X10(3) UL (ref 1.5–7.7)
NEUTROPHILS NFR BLD AUTO: 74 %
OSMOLALITY SERPL CALC.SUM OF ELEC: 311 MOSM/KG (ref 275–295)
PLATELET # BLD AUTO: 183 10(3)UL (ref 150–450)
POTASSIUM SERPL-SCNC: 4.2 MMOL/L (ref 3.5–5.1)
RBC # BLD AUTO: 3.68 X10(6)UL (ref 3.8–5.8)
SODIUM SERPL-SCNC: 145 MMOL/L (ref 136–145)
TROPONIN I SERPL-MCNC: <0.045 NG/ML (ref ?–0.04)
WBC # BLD AUTO: 7.4 X10(3) UL (ref 4–11)

## 2019-08-11 PROCEDURE — 99223 1ST HOSP IP/OBS HIGH 75: CPT | Performed by: INTERNAL MEDICINE

## 2019-08-11 PROCEDURE — 99223 1ST HOSP IP/OBS HIGH 75: CPT | Performed by: HOSPITALIST

## 2019-08-11 PROCEDURE — 71045 X-RAY EXAM CHEST 1 VIEW: CPT | Performed by: EMERGENCY MEDICINE

## 2019-08-11 RX ORDER — HEPARIN SODIUM 5000 [USP'U]/ML
5000 INJECTION, SOLUTION INTRAVENOUS; SUBCUTANEOUS EVERY 12 HOURS SCHEDULED
Status: DISCONTINUED | OUTPATIENT
Start: 2019-08-11 | End: 2019-08-13

## 2019-08-11 RX ORDER — AZITHROMYCIN 250 MG/1
250 TABLET, FILM COATED ORAL EVERY 24 HOURS
Status: DISCONTINUED | OUTPATIENT
Start: 2019-08-12 | End: 2019-08-12

## 2019-08-11 RX ORDER — ONDANSETRON 2 MG/ML
4 INJECTION INTRAMUSCULAR; INTRAVENOUS EVERY 6 HOURS PRN
Status: DISCONTINUED | OUTPATIENT
Start: 2019-08-11 | End: 2019-08-13

## 2019-08-11 RX ORDER — CLOPIDOGREL BISULFATE 75 MG/1
75 TABLET ORAL DAILY
Status: DISCONTINUED | OUTPATIENT
Start: 2019-08-12 | End: 2019-08-13

## 2019-08-11 RX ORDER — MONTELUKAST SODIUM 10 MG/1
10 TABLET ORAL NIGHTLY
Status: DISCONTINUED | OUTPATIENT
Start: 2019-08-12 | End: 2019-08-13

## 2019-08-11 RX ORDER — SODIUM CHLORIDE 0.9 % (FLUSH) 0.9 %
3 SYRINGE (ML) INJECTION AS NEEDED
Status: DISCONTINUED | OUTPATIENT
Start: 2019-08-11 | End: 2019-08-13

## 2019-08-11 RX ORDER — POLYETHYLENE GLYCOL 3350 17 G/17G
17 POWDER, FOR SOLUTION ORAL DAILY PRN
Status: DISCONTINUED | OUTPATIENT
Start: 2019-08-11 | End: 2019-08-13

## 2019-08-11 RX ORDER — ACETAMINOPHEN 325 MG/1
650 TABLET ORAL EVERY 6 HOURS PRN
Status: DISCONTINUED | OUTPATIENT
Start: 2019-08-11 | End: 2019-08-13

## 2019-08-11 RX ORDER — PANTOPRAZOLE SODIUM 40 MG/1
40 TABLET, DELAYED RELEASE ORAL
Status: DISCONTINUED | OUTPATIENT
Start: 2019-08-12 | End: 2019-08-13

## 2019-08-11 RX ORDER — IPRATROPIUM BROMIDE AND ALBUTEROL SULFATE 2.5; .5 MG/3ML; MG/3ML
3 SOLUTION RESPIRATORY (INHALATION)
Status: DISCONTINUED | OUTPATIENT
Start: 2019-08-11 | End: 2019-08-13

## 2019-08-11 RX ORDER — CETIRIZINE HYDROCHLORIDE 5 MG/1
5 TABLET ORAL DAILY
Status: DISCONTINUED | OUTPATIENT
Start: 2019-08-12 | End: 2019-08-13

## 2019-08-11 RX ORDER — IPRATROPIUM BROMIDE AND ALBUTEROL SULFATE 2.5; .5 MG/3ML; MG/3ML
3 SOLUTION RESPIRATORY (INHALATION) ONCE
Status: COMPLETED | OUTPATIENT
Start: 2019-08-11 | End: 2019-08-11

## 2019-08-11 RX ORDER — METHYLPREDNISOLONE SODIUM SUCCINATE 40 MG/ML
40 INJECTION, POWDER, LYOPHILIZED, FOR SOLUTION INTRAMUSCULAR; INTRAVENOUS EVERY 8 HOURS
Status: DISCONTINUED | OUTPATIENT
Start: 2019-08-11 | End: 2019-08-12

## 2019-08-11 RX ORDER — FLUTICASONE PROPIONATE 50 MCG
2 SPRAY, SUSPENSION (ML) NASAL NIGHTLY
Status: DISCONTINUED | OUTPATIENT
Start: 2019-08-11 | End: 2019-08-13

## 2019-08-11 RX ORDER — ATORVASTATIN CALCIUM 10 MG/1
10 TABLET, FILM COATED ORAL NIGHTLY
Status: DISCONTINUED | OUTPATIENT
Start: 2019-08-11 | End: 2019-08-13

## 2019-08-11 RX ORDER — ALBUTEROL SULFATE 2.5 MG/3ML
2.5 SOLUTION RESPIRATORY (INHALATION) ONCE
Status: COMPLETED | OUTPATIENT
Start: 2019-08-11 | End: 2019-08-11

## 2019-08-11 RX ORDER — PREDNISONE 20 MG/1
40 TABLET ORAL ONCE
Status: COMPLETED | OUTPATIENT
Start: 2019-08-11 | End: 2019-08-11

## 2019-08-11 NOTE — ED NOTES
Orders for admission, patient is aware of plan and ready to go upstairs. Any questions, please call ED RN Denae Luther  at extension [phone].

## 2019-08-11 NOTE — CONSULTS
[facility] - [facility]    Consult Note    Date:  8/11/2019  Date of Admission:  8/11/2019      Chief Complaint:   Conrad Alex is a(n) 78year old male with dyspnea.     HPI:   The patient has a history of end-stage COPD as he is followed by Dr. Hutson Room auscultation. Cardiac regular rate and rhythm no murmur. Abdomen nontender, without hepatosplenomegaly and no mass appreciable. Extremities without clubbing cyanosis nor edema. Neurologic grossly intact with symmetric tone and strength and reflex.

## 2019-08-11 NOTE — ED INITIAL ASSESSMENT (HPI)
Pt arrived via medics to rm 21 with nrb 15l for complaint of sob, 02 sats at home 93% on 10l 02, pt denies chest pain

## 2019-08-11 NOTE — ED PROVIDER NOTES
Patient Seen in: [facility] AND [facility] Emergency Department    History   Patient presents with:  Dyspnea LINDA SOB (respiratory)    Stated Complaint: sob    HPI    Patient is a 25-year-old male who presents to the emergency department with a chief complaint of Pulmonary/Chest: Accessory muscle usage present. Tachypnea noted. He is in respiratory distress. He has decreased breath sounds. He has wheezes. Musculoskeletal: Normal range of motion. Neurological: He is alert and oriented to person, place, and time. Please view results for these tests on the individual orders. CBC WITH DIFFERENTIAL WITH PLATELET    Narrative: The following orders were created for panel order CBC WITH DIFFERENTIAL WITH PLATELET.   Procedure                               Abnormalit

## 2019-08-11 NOTE — ED NOTES
Walked pt in hernandez with portable 02 10L nc, 02 sats dropped to 80%, pt unable to speak in full sentences

## 2019-08-11 NOTE — PROGRESS NOTES
[facility] Pharmacy Note:  Renal Dose Adjustment for Cetirizine (ZYRTEC)    Janiya Condon has been prescribed Cetirizine (Zyrtec) 10 mg orally daily. Estimated Creatinine Clearance: 25.7 mL/min (A) (based on SCr of 1.73 mg/dL (H)).     His calculated creatinin

## 2019-08-11 NOTE — ED NOTES
Tray delivered to bedside. Pt eating lunch. Denies needs or complaints at this time. Pt declines SOB at this time.

## 2019-08-11 NOTE — H&P
24    Patient: Jan Babb  : 1954 Visit date: 2024    Dear  Obdulia Waldrop MD    Thank you for referring Jan Babb to my practice.  Please find my assessment and plan below.        Good evening.  Thank you for the referral.  I saw Brandon in my clinic last week.  She has bilateral inguinal hernias and has symptoms including severe pain especially on the left side with activity.  She would be a candidate for repair.  She has significant history of CAD on Plavix and Eliquis, COPD, and sleep apnea.  After seeing Jan in my clinic last week, we did plan for robotic bilateral inguinal hernia repair.  However, I have since spoken to her cardiologist.  Jan recently had a stent placed about 5 months ago and needs to stay on the Plavix and Eliquis for at least a year.  After speaking with the cardiologist, I decided to hold on any surgical intervention for the hernias as they do not contain bowel or are incarcerated.  I will have Jan follow-up with me in clinic to review these findings and plan for conservative management to help with her symptoms until she is are able to undergo surgical intervention.  Thank you once again for the referral and please let me know if you need anything else.    Sincerely,   Halley Collier MD      [place] [facility] - [facility]    History & Physical    Pepito Huynh Patient Status:  Inpatient    1940 MRN [id number]   Location [facility] 5SW/SE Attending Fe Forde,  [facility] Day # 0 PCP `     Date:  2019  Date of Admission:   simvastatin 20 MG Oral Tab Take 20 mg by mouth nightly. Review of Systems:     + SOB  + cough  No CP  No f/c  No n/v/c/d    The remainder of the ROS is negative except as noted in the HPI.     Physical Exam:   Vital Signs:  Blood pressure 107/58, pu Electronically signed on 08/11/2019 at 10:23 by Yue Hobson DO      Assessment/Plan:       AECOPD  Acute bronchitis with bronchospasm  - pulm on consult  - cont nebs, IV solu medrol, ceftriaxone, azithromycin  - cont home Trelegy  - cont singulair    Hx

## 2019-08-11 NOTE — PLAN OF CARE
Problem: Patient Centered Care  Goal: Patient preferences are identified and integrated in the patient's plan of care  Description  Interventions:  - What would you like us to know as we care for you?  \"I live at home with my son\"  - Provide timely, com pre-medicate as appropriate  Outcome: Progressing  Note:   Pt denies pain     Problem: SAFETY ADULT - FALL  Goal: Free from fall injury  Description  INTERVENTIONS:  - Assess pt frequently for physical needs  - Identify cognitive and physical deficits and

## 2019-08-12 LAB
ANION GAP SERPL CALC-SCNC: 5 MMOL/L (ref 0–18)
BASOPHILS # BLD AUTO: 0 X10(3) UL (ref 0–0.2)
BASOPHILS NFR BLD AUTO: 0 %
BUN BLD-MCNC: 46 MG/DL (ref 7–18)
BUN/CREAT SERPL: 27.7 (ref 10–20)
CALCIUM BLD-MCNC: 9.3 MG/DL (ref 8.5–10.1)
CHLORIDE SERPL-SCNC: 109 MMOL/L (ref 98–112)
CO2 SERPL-SCNC: 30 MMOL/L (ref 21–32)
CREAT BLD-MCNC: 1.66 MG/DL (ref 0.7–1.3)
DEPRECATED RDW RBC AUTO: 48.9 FL (ref 35.1–46.3)
EOSINOPHIL # BLD AUTO: 0 X10(3) UL (ref 0–0.7)
EOSINOPHIL NFR BLD AUTO: 0 %
ERYTHROCYTE [DISTWIDTH] IN BLOOD BY AUTOMATED COUNT: 13.7 % (ref 11–15)
GLUCOSE BLD-MCNC: 172 MG/DL (ref 70–99)
HCT VFR BLD AUTO: 30.7 % (ref 39–53)
HGB BLD-MCNC: 9.4 G/DL (ref 13–17.5)
IMM GRANULOCYTES # BLD AUTO: 0.02 X10(3) UL (ref 0–1)
IMM GRANULOCYTES NFR BLD: 0.5 %
LYMPHOCYTES # BLD AUTO: 0.23 X10(3) UL (ref 1–4)
LYMPHOCYTES NFR BLD AUTO: 6.2 %
MCH RBC QN AUTO: 29.7 PG (ref 26–34)
MCHC RBC AUTO-ENTMCNC: 30.6 G/DL (ref 31–37)
MCV RBC AUTO: 97.2 FL (ref 80–100)
MONOCYTES # BLD AUTO: 0.08 X10(3) UL (ref 0.1–1)
MONOCYTES NFR BLD AUTO: 2.2 %
NEUTROPHILS # BLD AUTO: 3.38 X10 (3) UL (ref 1.5–7.7)
NEUTROPHILS # BLD AUTO: 3.38 X10(3) UL (ref 1.5–7.7)
NEUTROPHILS NFR BLD AUTO: 91.1 %
OSMOLALITY SERPL CALC.SUM OF ELEC: 314 MOSM/KG (ref 275–295)
PLATELET # BLD AUTO: 174 10(3)UL (ref 150–450)
POTASSIUM SERPL-SCNC: 4.7 MMOL/L (ref 3.5–5.1)
RBC # BLD AUTO: 3.16 X10(6)UL (ref 3.8–5.8)
SODIUM SERPL-SCNC: 144 MMOL/L (ref 136–145)
WBC # BLD AUTO: 3.7 X10(3) UL (ref 4–11)

## 2019-08-12 PROCEDURE — 99233 SBSQ HOSP IP/OBS HIGH 50: CPT | Performed by: HOSPITALIST

## 2019-08-12 PROCEDURE — 99233 SBSQ HOSP IP/OBS HIGH 50: CPT | Performed by: INTERNAL MEDICINE

## 2019-08-12 RX ORDER — METHYLPREDNISOLONE SODIUM SUCCINATE 40 MG/ML
40 INJECTION, POWDER, LYOPHILIZED, FOR SOLUTION INTRAMUSCULAR; INTRAVENOUS 2 TIMES DAILY
Status: DISCONTINUED | OUTPATIENT
Start: 2019-08-12 | End: 2019-08-13

## 2019-08-12 NOTE — PROGRESS NOTES
[facility] Dosing Service  Antibiotic Dosing    Stephen Oshea is a 78year old male for whom pharmacy is dosing Zosyn for treatment of  bronchiectasis . Allergies: has No Known Allergies.     Vitals: BP 95/49 (BP Location: Right arm)   Pulse 81   Temp

## 2019-08-12 NOTE — PLAN OF CARE
Pulmonary Rehab handout given to patient. Discussed features of Pulm Rehab, and answered questions. Instructed patient to discuss rehab options with Doctor after discharge, and obtain an order if appropriate.   Andres Corley RN

## 2019-08-12 NOTE — RESPIRATORY THERAPY NOTE
Patient seen for COPD education. Incentive spirometer encouraged. Flutter technique reviewed and encouraged for secretion management. Patient wears up to 10 liters of oxygen at home.   Has no issues getting his medication but often feels they don't help

## 2019-08-12 NOTE — PROGRESS NOTES
Pulmonary/Critical Care Follow Up Note    HPI:   Nano Wei is a 78year old male with Patient presents with:  Dyspnea LINDA SOB (respiratory)      PCP `  Admission Attending Juan Torre Day #1    No sob  No cough  No wheeze  Now state oz (52.5 kg), SpO2 94 %.     Intake/Output Summary (Last 24 hours) at 8/12/2019 1056  Last data filed at 8/12/2019 0600  Gross per 24 hour   Intake 190 ml   Output 240 ml   Net -50 ml     nad  Lung few rhonchi and now wheeze  cv reg   abd soft +bs  Ext warm

## 2019-08-12 NOTE — PLAN OF CARE
Problem: Patient Centered Care  Goal: Patient preferences are identified and integrated in the patient's plan of care  Description  Interventions:  - What would you like us to know as we care for you?  \"I live at home with my son\"  - Provide timely, com physical needs  - Identify cognitive and physical deficits and behaviors that affect risk of falls.   - [place] fall precautions as indicated by assessment.  - Educate pt/family on patient safety including physical limitations  - Instruct pt to call for a

## 2019-08-12 NOTE — CM/SW NOTE
8/13 - 237pm:  L/m for Gian Mccollumsolange from [facility] regarding pt's discharge today.     ----------------  8/12 - 1008am:  Pt has been screened per chart review and during nursing rounds. Pt is from home w/ daughter. SW confirmed pt is current w/ [facility].  SOHEILA/GERALD will remai

## 2019-08-12 NOTE — PLAN OF CARE
Plan to d/c to home with [facility] when medically stable. Problem: Patient Centered Care  Goal: Patient preferences are identified and integrated in the patient's plan of care  Description  Interventions:  - What would you like us to know as we care for you?  \ injury  Description  INTERVENTIONS:  - Assess pt frequently for physical needs  - Identify cognitive and physical deficits and behaviors that affect risk of falls.   - [place] fall precautions as indicated by assessment.  - Educate pt/family on patient sa

## 2019-08-12 NOTE — PROGRESS NOTES
[facility]    Progress Note    Christina Hussein Patient Status:  Inpatient    1940 MRN [id number]   Location [facility] 5SW/SE Attending Cara Cui,  [facility] Day # 1 PCP `       Subjective:     Pt feels at baseline.   Stil 06/17/2019 10:45:34 Anterolateral slight ST elevation no longer seen Electronically signed on 08/11/2019 at 10:23 by Festus Gutierrez DO      Assessment and Plan:     AECOPD  Acute bronchitis with bronchospasm  Bronchiectasis  - pulm on consult  - cont nebs

## 2019-08-13 VITALS
BODY MASS INDEX: 20.52 KG/M2 | TEMPERATURE: 98 F | RESPIRATION RATE: 16 BRPM | HEART RATE: 69 BPM | HEIGHT: 63 IN | WEIGHT: 115.81 LBS | SYSTOLIC BLOOD PRESSURE: 111 MMHG | DIASTOLIC BLOOD PRESSURE: 54 MMHG | OXYGEN SATURATION: 95 %

## 2019-08-13 LAB
ANION GAP SERPL CALC-SCNC: 6 MMOL/L (ref 0–18)
BASOPHILS # BLD AUTO: 0 X10(3) UL (ref 0–0.2)
BASOPHILS NFR BLD AUTO: 0 %
BUN BLD-MCNC: 50 MG/DL (ref 7–18)
BUN/CREAT SERPL: 26 (ref 10–20)
CALCIUM BLD-MCNC: 9 MG/DL (ref 8.5–10.1)
CHLORIDE SERPL-SCNC: 107 MMOL/L (ref 98–112)
CO2 SERPL-SCNC: 28 MMOL/L (ref 21–32)
CREAT BLD-MCNC: 1.92 MG/DL (ref 0.7–1.3)
DEPRECATED RDW RBC AUTO: 48.1 FL (ref 35.1–46.3)
EOSINOPHIL # BLD AUTO: 0 X10(3) UL (ref 0–0.7)
EOSINOPHIL NFR BLD AUTO: 0 %
ERYTHROCYTE [DISTWIDTH] IN BLOOD BY AUTOMATED COUNT: 13.5 % (ref 11–15)
GLUCOSE BLD-MCNC: 209 MG/DL (ref 70–99)
HCT VFR BLD AUTO: 29.3 % (ref 39–53)
HGB BLD-MCNC: 9 G/DL (ref 13–17.5)
IMM GRANULOCYTES # BLD AUTO: 0.01 X10(3) UL (ref 0–1)
IMM GRANULOCYTES NFR BLD: 0.2 %
LYMPHOCYTES # BLD AUTO: 0.28 X10(3) UL (ref 1–4)
LYMPHOCYTES NFR BLD AUTO: 6 %
MCH RBC QN AUTO: 29.6 PG (ref 26–34)
MCHC RBC AUTO-ENTMCNC: 30.7 G/DL (ref 31–37)
MCV RBC AUTO: 96.4 FL (ref 80–100)
MONOCYTES # BLD AUTO: 0.16 X10(3) UL (ref 0.1–1)
MONOCYTES NFR BLD AUTO: 3.4 %
NEUTROPHILS # BLD AUTO: 4.21 X10 (3) UL (ref 1.5–7.7)
NEUTROPHILS # BLD AUTO: 4.21 X10(3) UL (ref 1.5–7.7)
NEUTROPHILS NFR BLD AUTO: 90.4 %
OSMOLALITY SERPL CALC.SUM OF ELEC: 311 MOSM/KG (ref 275–295)
PLATELET # BLD AUTO: 171 10(3)UL (ref 150–450)
POTASSIUM SERPL-SCNC: 4.8 MMOL/L (ref 3.5–5.1)
RBC # BLD AUTO: 3.04 X10(6)UL (ref 3.8–5.8)
SODIUM SERPL-SCNC: 141 MMOL/L (ref 136–145)
WBC # BLD AUTO: 4.7 X10(3) UL (ref 4–11)

## 2019-08-13 PROCEDURE — 99239 HOSP IP/OBS DSCHRG MGMT >30: CPT | Performed by: HOSPITALIST

## 2019-08-13 PROCEDURE — 99232 SBSQ HOSP IP/OBS MODERATE 35: CPT | Performed by: INTERNAL MEDICINE

## 2019-08-13 RX ORDER — IPRATROPIUM BROMIDE AND ALBUTEROL SULFATE 2.5; .5 MG/3ML; MG/3ML
3 SOLUTION RESPIRATORY (INHALATION)
Status: DISCONTINUED | OUTPATIENT
Start: 2019-08-13 | End: 2019-08-13

## 2019-08-13 RX ORDER — PREDNISONE 20 MG/1
TABLET ORAL
Qty: 9 TABLET | Refills: 0 | Status: ON HOLD | OUTPATIENT
Start: 2019-08-13 | End: 2019-10-09

## 2019-08-13 RX ORDER — IPRATROPIUM BROMIDE AND ALBUTEROL SULFATE 2.5; .5 MG/3ML; MG/3ML
3 SOLUTION RESPIRATORY (INHALATION) EVERY 6 HOURS PRN
Status: DISCONTINUED | OUTPATIENT
Start: 2019-08-13 | End: 2019-08-13

## 2019-08-13 RX ORDER — LEVOFLOXACIN 500 MG/1
500 TABLET, FILM COATED ORAL DAILY
Qty: 10 TABLET | Refills: 0 | Status: SHIPPED | OUTPATIENT
Start: 2019-08-13 | End: 2019-08-23

## 2019-08-13 NOTE — PLAN OF CARE
Problem: Patient Centered Care  Goal: Patient preferences are identified and integrated in the patient's plan of care  Description  Interventions:  - What would you like us to know as we care for you?  \"I live at home with my son\"  - Provide timely, com injury  Description  INTERVENTIONS:  - Assess pt frequently for physical needs  - Identify cognitive and physical deficits and behaviors that affect risk of falls.   - [place] fall precautions as indicated by assessment.  - Educate pt/family on patient sa

## 2019-08-13 NOTE — HOME CARE LIAISON
Patient is current with [facility] services. Orders received to resume services. Residential staff notified to resume care. Will follow.

## 2019-08-13 NOTE — PLAN OF CARE
Problem: Patient Centered Care  Goal: Patient preferences are identified and integrated in the patient's plan of care  Description  Interventions:  - What would you like us to know as we care for you?  \"I live at home with my son\"  - Provide timely, com physical needs  - Identify cognitive and physical deficits and behaviors that affect risk of falls.   - [place] fall precautions as indicated by assessment.  - Educate pt/family on patient safety including physical limitations  - Instruct pt to call for a

## 2019-08-13 NOTE — PROGRESS NOTES
[place] [facility] - [facility]    Progress Note    Shay Márquez Patient Status:  Inpatient    1940 MRN [id number]   Location [facility] 5SW/SE Attending Hannah Lyon,  [facility]   Day # 2 PCP `        Subjective:     Constitutional: Negative (H) 06/17/2019    MG 2.3 06/18/2019    TROP <0.045 08/11/2019                        La Simon MD  8/13/2019

## 2019-08-14 NOTE — DISCHARGE SUMMARY
[place] [facility] - [facility]    Discharge Summary    Ganesh Rinaldi Patient Status:  Inpatient    1940 MRN [id number]   Location [facility] 5SW/SE Attending No att. providers found   [address] Day # 2 PCP `     Date of Admission: 2019 Dispos cancer s/p resection.   Abnormal CT scan the chest–the patient had a 2.2 cm cystic area in the right lung apex with adjacent scarring.  This was identified less than 2 months ago.   - Per pulm the cystic abnormality may be scar from prior surgical intervent known as:  SINGULAIR      Take 10 mg by mouth nightly. Refills:  0     Omeprazole 40 MG Cpdr      Take 20 mg by mouth every morning before breakfast.   Refills:  0     simvastatin 20 MG Tabs  Commonly known as:  ZOCOR      Take 20 mg by mouth nightly.

## 2019-08-16 ENCOUNTER — TELEPHONE (OUTPATIENT)
Dept: MEDSURG UNIT | Facility: HOSPITAL | Age: 79
End: 2019-08-16

## 2019-08-19 ENCOUNTER — TELEPHONE (OUTPATIENT)
Dept: MEDSURG UNIT | Facility: HOSPITAL | Age: 79
End: 2019-08-19

## 2019-10-07 ENCOUNTER — HOSPITAL ENCOUNTER (INPATIENT)
Facility: HOSPITAL | Age: 79
LOS: 3 days | Discharge: HOME HEALTH | DRG: 191 | End: 2019-10-10
Attending: EMERGENCY MEDICINE | Admitting: HOSPITALIST
Payer: MEDICARE

## 2019-10-07 ENCOUNTER — APPOINTMENT (OUTPATIENT)
Dept: CT IMAGING | Facility: HOSPITAL | Age: 79
DRG: 191 | End: 2019-10-07
Attending: INTERNAL MEDICINE
Payer: MEDICARE

## 2019-10-07 ENCOUNTER — APPOINTMENT (OUTPATIENT)
Dept: GENERAL RADIOLOGY | Facility: HOSPITAL | Age: 79
DRG: 191 | End: 2019-10-07
Attending: EMERGENCY MEDICINE
Payer: MEDICARE

## 2019-10-07 DIAGNOSIS — R06.89 RESPIRATORY INSUFFICIENCY: ICD-10-CM

## 2019-10-07 DIAGNOSIS — J44.1 COPD EXACERBATION (HCC): Primary | ICD-10-CM

## 2019-10-07 PROCEDURE — 99223 1ST HOSP IP/OBS HIGH 75: CPT | Performed by: HOSPITALIST

## 2019-10-07 PROCEDURE — 71045 X-RAY EXAM CHEST 1 VIEW: CPT | Performed by: EMERGENCY MEDICINE

## 2019-10-07 PROCEDURE — 99222 1ST HOSP IP/OBS MODERATE 55: CPT | Performed by: INTERNAL MEDICINE

## 2019-10-07 PROCEDURE — 71250 CT THORAX DX C-: CPT | Performed by: INTERNAL MEDICINE

## 2019-10-07 RX ORDER — CLOPIDOGREL BISULFATE 75 MG/1
75 TABLET ORAL DAILY
Status: DISCONTINUED | OUTPATIENT
Start: 2019-10-07 | End: 2019-10-10

## 2019-10-07 RX ORDER — ONDANSETRON 2 MG/ML
4 INJECTION INTRAMUSCULAR; INTRAVENOUS EVERY 6 HOURS PRN
Status: DISCONTINUED | OUTPATIENT
Start: 2019-10-07 | End: 2019-10-10

## 2019-10-07 RX ORDER — ACETAMINOPHEN 325 MG/1
650 TABLET ORAL EVERY 6 HOURS PRN
Status: DISCONTINUED | OUTPATIENT
Start: 2019-10-07 | End: 2019-10-10

## 2019-10-07 RX ORDER — HEPARIN SODIUM 5000 [USP'U]/ML
5000 INJECTION, SOLUTION INTRAVENOUS; SUBCUTANEOUS EVERY 12 HOURS SCHEDULED
Status: DISCONTINUED | OUTPATIENT
Start: 2019-10-07 | End: 2019-10-10

## 2019-10-07 RX ORDER — METHYLPREDNISOLONE SODIUM SUCCINATE 40 MG/ML
40 INJECTION, POWDER, LYOPHILIZED, FOR SOLUTION INTRAMUSCULAR; INTRAVENOUS ONCE
Status: COMPLETED | OUTPATIENT
Start: 2019-10-07 | End: 2019-10-07

## 2019-10-07 RX ORDER — METHYLPREDNISOLONE SODIUM SUCCINATE 40 MG/ML
40 INJECTION, POWDER, LYOPHILIZED, FOR SOLUTION INTRAMUSCULAR; INTRAVENOUS EVERY 6 HOURS
Status: DISCONTINUED | OUTPATIENT
Start: 2019-10-07 | End: 2019-10-09

## 2019-10-07 RX ORDER — FLUTICASONE PROPIONATE 50 MCG
2 SPRAY, SUSPENSION (ML) NASAL DAILY
Status: DISCONTINUED | OUTPATIENT
Start: 2019-10-07 | End: 2019-10-10

## 2019-10-07 RX ORDER — FENOFIBRATE 134 MG/1
134 CAPSULE ORAL
Status: DISCONTINUED | OUTPATIENT
Start: 2019-10-07 | End: 2019-10-10

## 2019-10-07 RX ORDER — LEVOFLOXACIN 750 MG/1
750 TABLET ORAL
Status: DISCONTINUED | OUTPATIENT
Start: 2019-10-07 | End: 2019-10-10

## 2019-10-07 RX ORDER — IPRATROPIUM BROMIDE AND ALBUTEROL SULFATE 2.5; .5 MG/3ML; MG/3ML
3 SOLUTION RESPIRATORY (INHALATION) EVERY 6 HOURS PRN
Status: DISCONTINUED | OUTPATIENT
Start: 2019-10-07 | End: 2019-10-10

## 2019-10-07 RX ORDER — PANTOPRAZOLE SODIUM 20 MG/1
20 TABLET, DELAYED RELEASE ORAL
Status: DISCONTINUED | OUTPATIENT
Start: 2019-10-07 | End: 2019-10-10

## 2019-10-07 RX ORDER — IPRATROPIUM BROMIDE AND ALBUTEROL SULFATE 2.5; .5 MG/3ML; MG/3ML
3 SOLUTION RESPIRATORY (INHALATION) ONCE
Status: COMPLETED | OUTPATIENT
Start: 2019-10-07 | End: 2019-10-07

## 2019-10-07 RX ORDER — MONTELUKAST SODIUM 10 MG/1
10 TABLET ORAL NIGHTLY
Status: DISCONTINUED | OUTPATIENT
Start: 2019-10-07 | End: 2019-10-10

## 2019-10-07 RX ORDER — IPRATROPIUM BROMIDE AND ALBUTEROL SULFATE 2.5; .5 MG/3ML; MG/3ML
3 SOLUTION RESPIRATORY (INHALATION) EVERY 6 HOURS
Status: DISCONTINUED | OUTPATIENT
Start: 2019-10-07 | End: 2019-10-10

## 2019-10-07 RX ORDER — ATORVASTATIN CALCIUM 10 MG/1
10 TABLET, FILM COATED ORAL NIGHTLY
Status: DISCONTINUED | OUTPATIENT
Start: 2019-10-07 | End: 2019-10-10

## 2019-10-07 RX ORDER — CETIRIZINE HYDROCHLORIDE 5 MG/1
5 TABLET ORAL DAILY
Status: DISCONTINUED | OUTPATIENT
Start: 2019-10-07 | End: 2019-10-10

## 2019-10-07 NOTE — PROGRESS NOTES
[facility] Pharmacy Note:  Renal Adjustment for levofloxacin [facility])    Seymour Saldaña is a 78year old male who has been prescribed levofloxacin (LEVAQUIN) 750 mg every 24 hrs.   CrCl is estimated creatinine clearance is 25.9 mL/min (A) (based on SCr of 1.75

## 2019-10-07 NOTE — PLAN OF CARE
Problem: Patient Centered Care  Goal: Patient preferences are identified and integrated in the patient's plan of care  Description  Interventions:  - What would you like us to know as we care for you?  Lives with son, home o2 10L, hx lung cancer    - Prov

## 2019-10-07 NOTE — PROGRESS NOTES
[facility] Pharmacy Note:  Renal Dose Adjustment for Cetirizine (ZYRTEC)    Erskin Balloon has been prescribed Cetirizine (Zyrtec) 10 mg orally daily. Estimated Creatinine Clearance: 24.4 mL/min (A) (based on SCr of 1.75 mg/dL (H)).     His calculated creatini

## 2019-10-07 NOTE — ED PROVIDER NOTES
Patient Seen in: [facility] AND [facility] Emergency Department    History   Patient presents with:  Dyspnea LINDA SOB (respiratory)    Stated Complaint: SOB     HPI    30-year-old male with past medical history of end-stage COPD on 10 L home oxygen, bronchiectasis, use: Not on file    Drug use: Not on file      Review of Systems :  Constitutional: As per HPI  Respiratory: (+) cough/dyspnea. Cardiovascular: Negative for chest pain and palpitations.      Positive for stated complaint: SOB  Other systems are as noted i Other Notes (entered by Technologist):      Additional Information (per Vision Radiologist): Short        Chest x-ray AP    IMPRESSION:  Single limited AP portable view of chest  Compared to August 11, 2019 chest x-ray    No radiographic evidence of acut On call medicine Dr. Dylan Alejandre graciously admitting with pulm Dr. Zoe Villar consulted and recs appreciated.     Disposition and Plan     Clinical Impression:  COPD exacerbation ([facility] [address]  (primary encounter diagnosis)  Respiratory insufficiency    Disposition:  A

## 2019-10-07 NOTE — CONSULTS
[facility]    Report of Consultation    French Carver Patient Status:  Emergency    1940 MRN [id number]   Location [address] Attending Maurizio Hayes MD   Hosp Day # 0 PCP `     Date of Admissio denies arthralgia, myalgia  Integumentary: denies rash, itching  Neurological: denies syncope, weakness, dizziness,   Psychiatric: denies depression, anxiety  Hematologic: denies bruising        Physical Exam:   Blood pressure 112/70, pulse 81, temperature likely infectious etiology. 3-month follow-up CT had been recommended. Will obtain repeat CT chest without contrast today.   -IV steroids and gradually wean  -Nebulizer treatments  -ICS/LABA/LAMA  -Antibiotic therapy with Levaquin at this time  -Prior his

## 2019-10-07 NOTE — H&P
[address] Patient Status:  Inpatient    1940 MRN [id number]   Location [facility]5W Attending Marques Roberts MD   Hosp Day # 0 PCP `     Date:  10/7/2019  Date of Admission:  10 needed. Montelukast Sodium 10 MG Oral Tab Take 10 mg by mouth nightly. Omeprazole 40 MG Oral Capsule Delayed Release Take 20 mg by mouth every morning before breakfast.   simvastatin 20 MG Oral Tab Take 20 mg by mouth nightly.    predniSONE 20 MG Oral T ALT 14 (L) 06/18/2019    T4F 1.3 06/18/2019    TSH 0.158 (L) 06/18/2019    DDIMER 0.72 10/07/2019    MG 1.9 10/07/2019    TROP <0.045 10/07/2019       Xr Chest Ap Portable  (cpt=71045)    Result Date: 10/7/2019  CONCLUSION: No acute cardiopulmonary abnorma pt lives with his son    D/w RN  D/w pt at length, answered all questions, patient is agreeable with proposed management plan as outlined above              Bandar Betancur MD  61/9/2127    **Certification      PHYSICIAN Certification of Need for Inpat

## 2019-10-07 NOTE — ED INITIAL ASSESSMENT (HPI)
Pt brought in by EMS from home for c/o SOB x3-4 days. Pt is on O2 at home, 10L NC, pt sats are 88% ORA, pt brought in on NR at 15L. Pt denies CP/N/V/F/C.  Hx lung CA

## 2019-10-07 NOTE — ED NOTES
Orders for admission, patient is aware of plan and ready to go upstairs. Any questions, please call ED LEWIS wren  at extension [phone]  Pt is aox4, needs assist with adl, from home, came with son with c/o sob. Pt has hx of lung ca and copd.  Pt normally uses

## 2019-10-08 PROCEDURE — 99232 SBSQ HOSP IP/OBS MODERATE 35: CPT | Performed by: INTERNAL MEDICINE

## 2019-10-08 PROCEDURE — 99233 SBSQ HOSP IP/OBS HIGH 50: CPT | Performed by: HOSPITALIST

## 2019-10-08 RX ORDER — 0.9 % SODIUM CHLORIDE 0.9 %
3 VIAL (ML) INJECTION AS NEEDED
Status: DISCONTINUED | OUTPATIENT
Start: 2019-10-08 | End: 2019-10-10

## 2019-10-08 NOTE — PROGRESS NOTES
[facility]    Progress Note    Erskin Balloon Patient Status:  Inpatient    1940 MRN [id number]   Location [facility]5W Attending Bola Goldberg MD   Hosp Day # 1 PCP `       Subjective:     Feels much better, breathin (immune mediated). - nebs, trilegy, IV solu medrol, levaquin.   - cont singulair  - swallow eval ok     Hx of CAD s/p remote MI  No chest pains, no palpitations, no dizziness  Trop nl, , but clinically/CXR no evidence of fluid overload  - cont met TB, viral, bacterial), recurrent aspiration bronchiolitis, inhalational injury, allergic bronchopulmonary aspergillosis (immune mediated). 2. Postsurgical changes from right apical wedge resection with surrounding scarring and bulla.  3. Multivessel coronar

## 2019-10-08 NOTE — CM/SW NOTE
BPCI    Met with patient at bedside to explain the BPCI/Medicare program.  Patient agreed with phone f/u for 3 months from [facility] after discharge from [facility]. Patient was enrolled under   . BPCI/Medicare letter provided.   Discharge plan to r Abdomen soft, nontender, nondistended, bowel sounds present in all 4 quadrants.

## 2019-10-08 NOTE — PLAN OF CARE
Problem: Patient Centered Care  Goal: Patient preferences are identified and integrated in the patient's plan of care  Description  Interventions:  - What would you like us to know as we care for you?  Lives with son, home o2 10L, hx lung cancer    - Prov oxygenation and minimize respiratory effort  - Oxygen supplementation based on oxygen saturation or ABGs  - Provide Smoking Cessation handout, if applicable  - Encourage broncho-pulmonary hygiene including cough, deep breathe, Incentive Spirometry  - Asses

## 2019-10-08 NOTE — PROGRESS NOTES
[facility]     Progress Note        David Syed Patient Status:  Inpatient    1940 MRN [id number]   Location [facility]5W Attending Boaz Rodriguez MD   Hosp Day # 1 PCP `       Subjective:   Patient seen and examine 25 mg 25 mg Oral BID   Montelukast Sodium (SINGULAIR) tab 10 mg 10 mg Oral Nightly   Pantoprazole Sodium (PROTONIX) EC tab 20 mg 20 mg Oral QAM AC   atorvastatin (LIPITOR) tab 10 mg 10 mg Oral Nightly       Continuous Infusions:     Physical Exam  Constitu Ekg 12-lead    Result Date: 10/7/2019  ECG Report  Interpretation  -------------------------- Sinus Rhythm Low voltage in limb leads.  -Old inferior infarct.  ABNORMAL When compared with ECG of 08/11/2019 09:43:59 No significant changes have occurred

## 2019-10-08 NOTE — PLAN OF CARE
Problem: Patient Centered Care  Goal: Patient preferences are identified and integrated in the patient's plan of care  Description  Interventions:  - What would you like us to know as we care for you?  Lives with son, home o2 10L, hx lung cancer    - Prov respiratory status  - Assess for changes in mentation and behavior  - Position to facilitate oxygenation and minimize respiratory effort  - Oxygen supplementation based on oxygen saturation or ABGs  - Provide Smoking Cessation handout, if applicable  - Enc most of the night, vss.

## 2019-10-08 NOTE — SLP NOTE
ADULT SWALLOWING EVALUATION    ASSESSMENT    ASSESSMENT/OVERALL IMPRESSION:  RN contacted SLP d/t concerns with coughing with solids during today's AM meal. RN reports pt with ill-fitting lower dentures.  Pt reports, \"When I eat too fast, I have a hard bria precautions; Dysphagia therapy  Discharge Recommendations/Plan: Undetermined    HISTORY   MEDICAL HISTORY  Reason for Referral: R/O aspiration    Problem List  Principal Problem:    COPD exacerbation ([facility] [address]  Active Problems:    Respiratory insufficiency of aspiration with 100 % accuracy over 2-3 session(s). In Progress   Goal #2 The patient/family/caregiver will demonstrate understanding and implementation of aspiration precautions and swallow strategies independently over 2-3 session(s).     In Progress

## 2019-10-08 NOTE — SLP NOTE
SPEECH DAILY NOTE - INPATIENT    ASSESSMENT & PLAN   ASSESSMENT  Patient alert and pleasant, sitting upright in bed upon arrival.  Breakfast tray present and RN administering medications. Patient with improved respiratory status, now down to 8L.   Patient without overt clinical signs or symptoms of aspiration across 100% of trials. Updated: The patient will tolerate Regular and Thin liquids without overt signs or symptoms of aspiration with 100% acc over 2 sessions.  Goal met/Updated   Goal #2 The sabas

## 2019-10-09 ENCOUNTER — TELEPHONE (OUTPATIENT)
Dept: CASE MANAGEMENT | Age: 79
End: 2019-10-09

## 2019-10-09 PROCEDURE — 99232 SBSQ HOSP IP/OBS MODERATE 35: CPT | Performed by: HOSPITALIST

## 2019-10-09 PROCEDURE — 99232 SBSQ HOSP IP/OBS MODERATE 35: CPT | Performed by: INTERNAL MEDICINE

## 2019-10-09 RX ORDER — PREDNISONE 20 MG/1
TABLET ORAL
Qty: 10 TABLET | Refills: 0 | Status: SHIPPED | OUTPATIENT
Start: 2019-10-09 | End: 2019-10-17

## 2019-10-09 RX ORDER — LEVOFLOXACIN 750 MG/1
750 TABLET ORAL EVERY OTHER DAY
Qty: 3 TABLET | Refills: 0 | Status: SHIPPED | OUTPATIENT
Start: 2019-10-09 | End: 2019-10-15

## 2019-10-09 RX ORDER — METHYLPREDNISOLONE SODIUM SUCCINATE 40 MG/ML
40 INJECTION, POWDER, LYOPHILIZED, FOR SOLUTION INTRAMUSCULAR; INTRAVENOUS EVERY 12 HOURS
Status: DISCONTINUED | OUTPATIENT
Start: 2019-10-09 | End: 2019-10-10

## 2019-10-09 NOTE — SLP NOTE
SPEECH DAILY NOTE - INPATIENT    ASSESSMENT & PLAN   ASSESSMENT  Patient seen in follow up for diet tolerance and dysphagia education. Patient eating lunch upon arrival.  Dentures in place, though continue to be loose at times.   Patient with adequate abil sessions. The patient demonstrated adequate adherence to above precautions.  Goal met     FOLLOW UP  Follow Up Needed: No    Session: 2 following BSSE    If you have any questions, please contact Jeffrey Montaño M.S., ALEJANDRA-SLP  Speech

## 2019-10-09 NOTE — PROGRESS NOTES
[facility]  Hospitalist Progress Note     Gary Puyina Patient Status:  Inpatient    1940  78year old [facility] [id number]   Location 523/523-A Attending Karthikeyan Ballesteros MD   Hosp Day # 2 PCP `     ASSESSMENT/PLAN    Acute COPD exacerba hours.    • MethylPREDNISolone Sodium Succ  40 mg Intravenous Q12H   • ipratropium-albuterol  3 mL Nebulization Q6H   • Fluticasone-Umeclidin-Vilant  1 puff Inhalation Daily   • levofloxacin  750 mg Oral Q48H   • Heparin Sodium (Porcine)  5,000 Units Subcu

## 2019-10-09 NOTE — DIETARY NOTE
NUTRITION BRIEF NOTE    Pt screened at nutrition risk d/t wt loss and poor PO. Pt not at nutritional risk at this time. Pt reports having good appetite and consuming >75% meals per chart review. Noted 4# wt loss in 2 months (not clinically significant).  21

## 2019-10-09 NOTE — PROGRESS NOTES
[facility]     Progress Note        Shakeel Franklin Patient Status:  Inpatient    1940 MRN [id number]   Location [facility]5W Attending Adriane Cuevas MD   Hosp Day # 2 PCP `       Subjective:   Patient seen and examine BID   Montelukast Sodium (SINGULAIR) tab 10 mg 10 mg Oral Nightly   Pantoprazole Sodium (PROTONIX) EC tab 20 mg 20 mg Oral QAM AC   atorvastatin (LIPITOR) tab 10 mg 10 mg Oral Nightly       Continuous Infusions:     Physical Exam  Constitutional: no acute 6/17/2019 with 2.2 cm cystic area seen in the right lung apex with multiple spiculated nodules within the right upper lobe suggestive of likely infectious etiology.   CT chest performed on 10/7/2019 with bilateral bronchiectasis with inflammatory changes wh

## 2019-10-09 NOTE — CM/SW NOTE
10/9: SOHEILA received MDO for home health evaluation. Per chart review, patient is current with [facility] services. Will need resume orders at time of discharge. Addendum, 10/10/2019  SOHEILA received MDO for HHC/RN to resume orders.  SOHEILA updated Sepideh/CHANDRAKANT, orders in

## 2019-10-10 VITALS
HEART RATE: 76 BPM | HEIGHT: 63 IN | SYSTOLIC BLOOD PRESSURE: 119 MMHG | OXYGEN SATURATION: 96 % | DIASTOLIC BLOOD PRESSURE: 63 MMHG | WEIGHT: 111 LBS | RESPIRATION RATE: 20 BRPM | TEMPERATURE: 98 F | BODY MASS INDEX: 19.67 KG/M2

## 2019-10-10 PROCEDURE — 99232 SBSQ HOSP IP/OBS MODERATE 35: CPT | Performed by: INTERNAL MEDICINE

## 2019-10-10 PROCEDURE — 99239 HOSP IP/OBS DSCHRG MGMT >30: CPT | Performed by: HOSPITALIST

## 2019-10-10 NOTE — PLAN OF CARE
Problem: Patient Centered Care  Goal: Patient preferences are identified and integrated in the patient's plan of care  Description  Interventions:  - What would you like us to know as we care for you?  Lives with son, home o2 10L, hx lung cancer    - Prov oxygenation and minimize respiratory effort  - Oxygen supplementation based on oxygen saturation or ABGs  - Provide Smoking Cessation handout, if applicable  - Encourage broncho-pulmonary hygiene including cough, deep breathe, Incentive Spirometry  - Asses in lowest position, call light within reach, frequent rounding by nursing staff.

## 2019-10-10 NOTE — DISCHARGE SUMMARY
[facility] HOSPITALIST  DISCHARGE SUMMARY     Waldo Mcneill Patient Status:  Inpatient    1940 MRN [id number]   Location [address] Attending Kin Meyers MD   Hosp Day # 3 PCP `     DATE OF ADMISSION: 10/7/2019  DATE OF DISCHARGE: 10/10 76  Resp:  [18-20] 20  BP: (111-127)/(61-70) 119/63  Gen: A+Ox3. No distress. HEENT: NCAT, neck supple, no carotid bruit. CV: RRR, S1S2, and intact distal pulses. No gallop, rub, murmur.   Pulm: Effort and breath sounds normal. No distress, wheezes, ral Fluticasone-Umeclidin-Vilant 100-62.5-25 MCG/INH Aepb  Commonly known as:  TRELEGY ELLIPTA      Inhale 1 puff into the lungs daily.    Quantity:  1 each  Refills:  0     Ipratropium-Albuterol  MCG/ACT Aers      Take 3 mL by nebulization every 4 (fou discharge from the hospital.

## 2019-10-10 NOTE — PROGRESS NOTES
[facility]     Progress Note        Katerin Canseco Patient Status:  Inpatient    1940 MRN [id number]   Location [facility]5W Attending Pipe Jaramillo MD   Hosp Day # 3 PCP `       Subjective:   Patient seen and examine Montelukast Sodium (SINGULAIR) tab 10 mg 10 mg Oral Nightly   Pantoprazole Sodium (PROTONIX) EC tab 20 mg 20 mg Oral QAM AC   atorvastatin (LIPITOR) tab 10 mg 10 mg Oral Nightly       Continuous Infusions:     Physical Exam  Constitutional: no acute dist

## 2019-10-11 ENCOUNTER — TELEPHONE (OUTPATIENT)
Dept: MEDSURG UNIT | Facility: HOSPITAL | Age: 79
End: 2019-10-11

## 2020-03-04 ENCOUNTER — LAB REQUISITION (OUTPATIENT)
Dept: LAB | Facility: HOSPITAL | Age: 80
End: 2020-03-04
Payer: MEDICARE

## 2020-03-04 DIAGNOSIS — Z85.118 PERSONAL HISTORY OF OTHER MALIGNANT NEOPLASM OF BRONCHUS AND LUNG: ICD-10-CM

## 2020-03-04 DIAGNOSIS — J44.9 CHRONIC OBSTRUCTIVE PULMONARY DISEASE, UNSPECIFIED (HCC): ICD-10-CM

## 2020-03-04 DIAGNOSIS — J96.20: ICD-10-CM

## 2020-03-04 PROCEDURE — 87205 SMEAR GRAM STAIN: CPT | Performed by: INTERNAL MEDICINE

## 2020-03-04 PROCEDURE — 87070 CULTURE OTHR SPECIMN AEROBIC: CPT | Performed by: INTERNAL MEDICINE
